# Patient Record
Sex: FEMALE | Race: WHITE | NOT HISPANIC OR LATINO | URBAN - METROPOLITAN AREA
[De-identification: names, ages, dates, MRNs, and addresses within clinical notes are randomized per-mention and may not be internally consistent; named-entity substitution may affect disease eponyms.]

---

## 2017-08-11 ENCOUNTER — OUTPATIENT (OUTPATIENT)
Dept: OUTPATIENT SERVICES | Facility: HOSPITAL | Age: 52
LOS: 1 days | Discharge: HOME | End: 2017-08-11

## 2017-08-22 ENCOUNTER — APPOINTMENT (OUTPATIENT)
Dept: RADIATION ONCOLOGY | Facility: CLINIC | Age: 52
End: 2017-08-22
Payer: COMMERCIAL

## 2017-08-22 VITALS
OXYGEN SATURATION: 97 % | BODY MASS INDEX: 25.57 KG/M2 | SYSTOLIC BLOOD PRESSURE: 119 MMHG | RESPIRATION RATE: 16 BRPM | HEIGHT: 65.75 IN | DIASTOLIC BLOOD PRESSURE: 82 MMHG | HEART RATE: 79 BPM | WEIGHT: 157.19 LBS

## 2017-08-22 DIAGNOSIS — E78.5 HYPERLIPIDEMIA, UNSPECIFIED: ICD-10-CM

## 2017-08-22 DIAGNOSIS — I10 ESSENTIAL (PRIMARY) HYPERTENSION: ICD-10-CM

## 2017-08-22 PROCEDURE — 99204 OFFICE O/P NEW MOD 45 MIN: CPT | Mod: GC

## 2017-08-22 RX ORDER — OXYCODONE AND ACETAMINOPHEN 5; 325 MG/1; MG/1
5-325 TABLET ORAL
Qty: 30 | Refills: 0 | Status: DISCONTINUED | COMMUNITY
Start: 2017-03-02

## 2017-08-22 RX ORDER — NORETHINDRONE ACETATE AND ETHINYL ESTRADIOL 1; 20 MG/1; UG/1
1-20 TABLET ORAL
Qty: 21 | Refills: 0 | Status: DISCONTINUED | COMMUNITY
Start: 2016-10-14

## 2017-08-22 RX ORDER — ATORVASTATIN CALCIUM 10 MG/1
10 TABLET, FILM COATED ORAL
Qty: 30 | Refills: 0 | Status: ACTIVE | COMMUNITY
Start: 2017-02-28

## 2017-08-22 RX ORDER — NITROFURANTOIN (MONOHYDRATE/MACROCRYSTALS) 25; 75 MG/1; MG/1
100 CAPSULE ORAL
Qty: 20 | Refills: 0 | Status: DISCONTINUED | COMMUNITY
Start: 2017-07-23

## 2017-08-22 RX ORDER — CLINDAMYCIN HYDROCHLORIDE 300 MG/1
300 CAPSULE ORAL
Qty: 21 | Refills: 0 | Status: DISCONTINUED | COMMUNITY
Start: 2017-03-03

## 2017-08-22 RX ORDER — SERTRALINE HYDROCHLORIDE 100 MG/1
100 TABLET, FILM COATED ORAL
Qty: 30 | Refills: 0 | Status: DISCONTINUED | COMMUNITY
Start: 2017-08-04

## 2017-08-22 RX ORDER — ELETRIPTAN HYDROBROMIDE 40 MG/1
40 TABLET, FILM COATED ORAL
Qty: 6 | Refills: 0 | Status: ACTIVE | COMMUNITY
Start: 2016-05-31

## 2017-08-22 RX ORDER — ALPRAZOLAM 0.25 MG/1
0.25 TABLET ORAL
Qty: 30 | Refills: 0 | Status: DISCONTINUED | COMMUNITY
Start: 2017-07-21

## 2022-10-12 ENCOUNTER — APPOINTMENT (OUTPATIENT)
Dept: ORTHOPEDIC SURGERY | Facility: CLINIC | Age: 57
End: 2022-10-12

## 2022-10-12 VITALS — BODY MASS INDEX: 24.11 KG/M2 | WEIGHT: 150 LBS | HEIGHT: 66 IN

## 2022-10-12 PROCEDURE — 99213 OFFICE O/P EST LOW 20 MIN: CPT | Mod: 25

## 2022-10-12 PROCEDURE — J3490M: CUSTOM

## 2022-10-12 PROCEDURE — 20611 DRAIN/INJ JOINT/BURSA W/US: CPT

## 2022-10-12 NOTE — DISCUSSION/SUMMARY
[de-identified] : Patient allowed to gently start resuming activities.\par Discussed change to medication prescription and usage. \par Offered cortisone steroid injection. \par Bracing options discussed with patient. \par cont with sleeve\par \par RE:  CINDI GARDINER \par \par Acct #- [id number] \par \par Attention:  Nurse Reviewer /Medical Director\par \par  \par Based on my patient's condition, I strongly believe that the MRI L knee is medically.necessary.  \par The patient has failed oral meds, injections and PT and conservative treatment in combination or by themselves and therefore needs the MRI.  \par The MRI will dictate further treatment t recommendations.\par \par

## 2022-10-12 NOTE — HISTORY OF PRESENT ILLNESS
[8] : 8 [0] : 0 [de-identified] : PAtient has increased symptoms in her left knee, pain with walking, stairs, kneeling, squatting. She had CSI in February which was helpful until recently. No recent injury. [FreeTextEntry1] : left knee  [de-identified] : tylenol used and knee sleeve

## 2022-10-18 ENCOUNTER — FORM ENCOUNTER (OUTPATIENT)
Age: 57
End: 2022-10-18

## 2022-10-19 ENCOUNTER — APPOINTMENT (OUTPATIENT)
Dept: MRI IMAGING | Facility: CLINIC | Age: 57
End: 2022-10-19

## 2022-10-19 PROCEDURE — 73721 MRI JNT OF LWR EXTRE W/O DYE: CPT | Mod: LT

## 2022-10-25 ENCOUNTER — APPOINTMENT (OUTPATIENT)
Dept: ORTHOPEDIC SURGERY | Facility: CLINIC | Age: 57
End: 2022-10-25

## 2022-10-25 VITALS — HEIGHT: 66 IN | BODY MASS INDEX: 24.91 KG/M2 | WEIGHT: 155 LBS

## 2022-10-25 DIAGNOSIS — M70.52 OTHER BURSITIS OF KNEE, LEFT KNEE: ICD-10-CM

## 2022-10-25 DIAGNOSIS — M17.12 UNILATERAL PRIMARY OSTEOARTHRITIS, LEFT KNEE: ICD-10-CM

## 2022-10-25 PROCEDURE — 99214 OFFICE O/P EST MOD 30 MIN: CPT

## 2022-10-25 NOTE — DISCUSSION/SUMMARY
[de-identified] : Patient allowed to gently start resuming activities.\par Discussed change to medication prescription and usage. \par Bracing options discussed with patient. \par Hyaluronic Acid inj pamphlet given to pt.\par \par 10/25/2022 \par \par  RE:  CINDI GARDINER \par \par Acct #- [id number] \par \par \par Attention:  Nurse Reviewer /Medical Director\par \par I am writing this letter as a medical necessity for PT program.\par Patient has tried analgesics, non-steroid anti-inflammatory agents, \par hot or cold compresses,injections of corticosteroids, etc)  which in combination or by themselves has not worked.\par Based on my patient's condition, I strongly believe that the PT is medically needed.\par  \par Thank you for your time and consideration.   \par \par \par

## 2022-10-25 NOTE — HISTORY OF PRESENT ILLNESS
[8] : 8 [0] : 0 [de-identified] : Patient has increased symptoms in her left knee, pain with walking, stairs, kneeling, squatting. She had CSI iwith some help and had MRI [FreeTextEntry1] : left knee  [de-identified] : tylenol used and knee sleeve

## 2022-10-25 NOTE — PHYSICAL EXAM
[Left] : left knee [5___] : hamstring 5[unfilled]/5 [Positive] : positive Huang [] : medial joint line tenderness [TWNoteComboBox7] : flexion 130 degrees [de-identified] : extension 0 degrees

## 2022-10-25 NOTE — DATA REVIEWED
[MRI] : MRI [Left] : left [Knee] : knee [Report was reviewed and noted in the chart] : The report was reviewed and noted in the chart [I independently reviewed and interpreted images and report] : I independently reviewed and interpreted images and report [FreeTextEntry1] : 1. Findings suggesting significant iliotibial band syndrome with inflammatory change in the distal vastus \par lateralis muscle and suprapatellar synovitis above the knee laterally. In the absence of chronic repetitive stress \par injury in the area inflammatory arthropathy should be considered and ruled out clinically with possible myositis.\par 2. 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 Mild chronic ACL sprain.\par 4. Medial and lateral meniscal degeneration without tear.\par 5. No acute fracture.

## 2023-01-18 ENCOUNTER — APPOINTMENT (OUTPATIENT)
Dept: ORTHOPEDIC SURGERY | Facility: CLINIC | Age: 58
End: 2023-01-18
Payer: COMMERCIAL

## 2023-01-18 VITALS — WEIGHT: 155 LBS | HEIGHT: 66 IN | BODY MASS INDEX: 24.91 KG/M2

## 2023-01-18 DIAGNOSIS — M62.830 MUSCLE SPASM OF BACK: ICD-10-CM

## 2023-01-18 DIAGNOSIS — M43.16 SPONDYLOLISTHESIS, LUMBAR REGION: ICD-10-CM

## 2023-01-18 DIAGNOSIS — I10 ESSENTIAL (PRIMARY) HYPERTENSION: ICD-10-CM

## 2023-01-18 PROCEDURE — 20552 NJX 1/MLT TRIGGER POINT 1/2: CPT

## 2023-01-18 PROCEDURE — 99214 OFFICE O/P EST MOD 30 MIN: CPT | Mod: 25

## 2023-01-18 PROCEDURE — 72100 X-RAY EXAM L-S SPINE 2/3 VWS: CPT

## 2023-01-18 RX ORDER — METHYLPREDNISOLONE 4 MG/1
4 TABLET ORAL
Qty: 1 | Refills: 0 | Status: ACTIVE | COMMUNITY
Start: 2023-01-18 | End: 1900-01-01

## 2023-01-18 NOTE — ASSESSMENT
[FreeTextEntry1] : will administer tpi left side today, start her on medrol robaxin and therapy f/u 4 weeks

## 2023-01-18 NOTE — IMAGING
[de-identified] : Spine:\par Inspection/Palpation:\par No tenderness to palpation throughout Cervical/thoracic/lumbar spine. TTP over right parapsinal \par No bony stepoffs, No lesions.\par \par Gait:\par antalgic, able to perform bilateral toe and heel rise.  Able to perform tandem gait.  \par \par Range of Motion:\par Lumbar Spine: Flexion to 90 degrees, Extension to 30 degrees, rotation 30 degrees bilaterally, lateral flexion to 30 degrees bilaterally.\par \par Neurologic:\par \par Bilateral Lower Extremities 5/5 Iliopsoas/Quadriceps/Hamstrings/ Tibialis Anterior/ Gastrocnemius. Extensor Hallucis Longus/ Flexor Hallucis Longus except \par \par \par Sensation intact to light touch L2-S1\par \par X-ray AP/lateral lumbar spine with transitonal antomy.  scoliosis L3-4 and L4-5 disc height loss.  [de-identified] : normal [FreeTextEntry1] : she has autofusion of L5-s1, straightening and 3-4 narrowing with spondylolisthesis

## 2023-01-18 NOTE — HISTORY OF PRESENT ILLNESS
[2] : 2 [6] : 6 [Injection therapy] : injection therapy [de-identified] : 1-18-23- She is known to have had prior lumbar sprain spasm episodes. LAst significant one was in 2018 and she was treated by DR Haji did well with medrol therapy and in office tpi injection. \par she notes her present episode came on yesterday while lifting. Developed sharp left sided pain with spasm and limited range of motion. denies radicular complaints\par \par she works as  \par \par denies contributory pmh [] : no [FreeTextEntry1] : Lower back [FreeTextEntry9] : Flexeril [de-identified] : flexion, getting out of a chair

## 2023-01-18 NOTE — HISTORY OF PRESENT ILLNESS
[2] : 2 [6] : 6 [Injection therapy] : injection therapy [de-identified] : 1-18-23- She is known to have had prior lumbar sprain spasm episodes. LAst significant one was in 2018 and she was treated by DR Haji did well with medrol therapy and in office tpi injection. \par she notes her present episode came on yesterday while lifting. Developed sharp left sided pain with spasm and limited range of motion. denies radicular complaints\par \par she works as  \par \par denies contributory pmh [] : no [FreeTextEntry1] : Lower back [FreeTextEntry9] : Flexeril [de-identified] : flexion, getting out of a chair

## 2023-01-18 NOTE — IMAGING
[de-identified] : Spine:\par Inspection/Palpation:\par No tenderness to palpation throughout Cervical/thoracic/lumbar spine. TTP over right parapsinal \par No bony stepoffs, No lesions.\par \par Gait:\par antalgic, able to perform bilateral toe and heel rise.  Able to perform tandem gait.  \par \par Range of Motion:\par Lumbar Spine: Flexion to 90 degrees, Extension to 30 degrees, rotation 30 degrees bilaterally, lateral flexion to 30 degrees bilaterally.\par \par Neurologic:\par \par Bilateral Lower Extremities 5/5 Iliopsoas/Quadriceps/Hamstrings/ Tibialis Anterior/ Gastrocnemius. Extensor Hallucis Longus/ Flexor Hallucis Longus except \par \par \par Sensation intact to light touch L2-S1\par \par X-ray AP/lateral lumbar spine with transitonal antomy.  scoliosis L3-4 and L4-5 disc height loss.  [FreeTextEntry1] : she has autofusion of L5-s1, straightening and 3-4 narrowing with spondylolisthesis [de-identified] : normal

## 2023-01-18 NOTE — PROCEDURE
[Trigger point 1-2 muscle groups] : trigger point 1-2 muscle groups [Left] : of the left [Lumbar paraspinal muscle] : lumbar paraspinal muscle [Pain] : pain [Inflammation] : inflammation [X-ray evidence of Osteoarthritis on this or prior visit] : x-ray evidence of Osteoarthritis on this or prior visit [Alcohol] : alcohol [Betadine] : betadine [Ethyl Chloride sprayed topically] : ethyl chloride sprayed topically [Sterile technique used] : sterile technique used [___ cc    0.25%] : Bupivacaine (Marcaine) ~Vcc of 0.25%  [___ cc    40mg] : Triamcinolone (Kenalog) ~Vcc of 40 mg  [] : Patient tolerated procedure well [Call if redness, pain or fever occur] : call if redness, pain or fever occur [Apply ice for 15min out of every hour for the next 12-24 hours as tolerated] : apply ice for 15 minutes out of every hour for the next 12-24 hours as tolerated [Patient was advised to rest the joint(s) for ____ days] : patient was advised to rest the joint(s) for [unfilled] days [Previous OTC use and PT nontherapeutic] : patient has tried OTC's including aspirin, Ibuprofen, Aleve, etc or prescription NSAIDS, and/or exercises at home and/or physical therapy without satisfactory response [Patient had decreased mobility in the joint] : patient had decreased mobility in the joint [Risks, benefits, alternatives discussed / Verbal consent obtained] : the risks benefits, and alternatives have been discussed, and verbal consent was obtained

## 2023-02-17 ENCOUNTER — APPOINTMENT (OUTPATIENT)
Dept: ORTHOPEDIC SURGERY | Facility: CLINIC | Age: 58
End: 2023-02-17
Payer: COMMERCIAL

## 2023-02-17 VITALS — BODY MASS INDEX: 24.91 KG/M2 | WEIGHT: 155 LBS | HEIGHT: 66 IN

## 2023-02-17 PROCEDURE — 99213 OFFICE O/P EST LOW 20 MIN: CPT

## 2023-02-17 NOTE — IMAGING
[FreeTextEntry1] : she has autofusion of L5-s1, straightening and 3-4 narrowing with spondylolisthesis [de-identified] : normal

## 2023-02-17 NOTE — ASSESSMENT
[FreeTextEntry1] : 57 F with lumbar strain that improved and then acutely worsened. \par c/w Robaxin PRIN\par No NSAIDs due allergy\par MRI L spine to set up for possible LILIANA\par C/w PT

## 2023-02-17 NOTE — HISTORY OF PRESENT ILLNESS
[Lower back] : lower back [7] : 7 [5] : 5 [Localized] : localized [Sharp] : sharp [Constant] : constant [de-identified] : 1-18-23- She is known to have had prior lumbar sprain spasm episodes. LAst significant one was in 2018 and she was treated by DR Haji did well with medrol therapy and in office tpi injection. \par she notes her present episode came on yesterday while lifting. Developed sharp left sided pain with spasm and limited range of motion. 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  Has been taking muscle relaxants at night with some relief.  Cant take during day because it makes her too drowsy.  No NSAIDS due to allergy.  Has a heart procedure coming up next week.    [] : Post Surgical Visit: no [FreeTextEntry5] : Pt is here for follow up of lower back. Pain has improved since the last visit. Pt is attending PT 2x weekly. Pt received TPI at last visit with some relief.  [de-identified] : 2/17/23 [de-identified] : PT, TPI, meds

## 2023-02-19 ENCOUNTER — FORM ENCOUNTER (OUTPATIENT)
Age: 58
End: 2023-02-19

## 2023-02-20 ENCOUNTER — APPOINTMENT (OUTPATIENT)
Dept: MRI IMAGING | Facility: CLINIC | Age: 58
End: 2023-02-20
Payer: COMMERCIAL

## 2023-02-20 PROCEDURE — 72148 MRI LUMBAR SPINE W/O DYE: CPT

## 2023-03-08 ENCOUNTER — APPOINTMENT (OUTPATIENT)
Dept: ORTHOPEDIC SURGERY | Facility: CLINIC | Age: 58
End: 2023-03-08
Payer: COMMERCIAL

## 2023-03-08 VITALS — WEIGHT: 155 LBS | BODY MASS INDEX: 24.91 KG/M2 | HEIGHT: 66 IN

## 2023-03-08 DIAGNOSIS — S33.5XXA SPRAIN OF LIGAMENTS OF LUMBAR SPINE, INITIAL ENCOUNTER: ICD-10-CM

## 2023-03-08 DIAGNOSIS — M51.36 OTHER INTERVERTEBRAL DISC DEGENERATION, LUMBAR REGION: ICD-10-CM

## 2023-03-08 PROCEDURE — 99213 OFFICE O/P EST LOW 20 MIN: CPT

## 2023-03-08 NOTE — IMAGING
[de-identified] : MRI L spine \par 1. Convex right curvature.\par 2. L1-L2: Facet hypertrophy, ligamentum flavum hypertrophy, and minimal facet effusions.\par 3. L2-L3: Facet hypertrophy, ligamentum flavum hypertrophy, and facet effusion.\par 4. L3-L4: Loss of disc signal and height. Broad bulge, facet hypertrophy, ligamentum flavum hypertrophy.\par 5. L4-L5: Loss of disc signal. Broad bulge, facet hypertrophy. Central herniation and annular fissure with \par caudal migration posterior to L5.\par 6. L5-S1: Partial sacralization on the left. Facet hypertrophy.\par 7. 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 5 mm nonspecific high-signal intensity lesion on\par STIR imaging within the posterior superior margin of L2 to the left of midline with no surrounding marrow \par edema. Follow up imaging in three months suggested to confirm stability. [FreeTextEntry1] : she has autofusion of L5-s1, straightening and 3-4 narrowing with spondylolisthesis [de-identified] : normal

## 2023-03-08 NOTE — HISTORY OF PRESENT ILLNESS
[Lower back] : lower back [7] : 7 [5] : 5 [Localized] : localized [Sharp] : sharp [Constant] : constant [de-identified] : 1-18-23- She is known to have had prior lumbar sprain spasm episodes. LAst significant one was in 2018 and she was treated by DR Haji did well with medrol therapy and in office tpi injection. \par she notes her present episode came on yesterday while lifting. Developed sharp left sided pain with spasm and limited range of motion. 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  Has been taking muscle relaxants at night with some relief.  Cant take during day because it makes her too drowsy.  No NSAIDS due to allergy.  Has a heart procedure coming up next week.   \par \par 3/8/23 Pain is stable.  HEre today for MRI results.  Pain is worse in back. No radicular symptoms.  [] : Post Surgical Visit: no [FreeTextEntry5] : Pt is here for follow up of lower back. Pain has improved since the last visit. Pt is attending PT 2x weekly. Pt received TPI at last visit with some relief.  [de-identified] : 2/17/23 [de-identified] : PT, TPI, meds

## 2023-03-08 NOTE — HISTORY OF PRESENT ILLNESS
[Lower back] : lower back [7] : 7 [5] : 5 [Localized] : localized [Sharp] : sharp [Constant] : constant [de-identified] : 1-18-23- She is known to have had prior lumbar sprain spasm episodes. LAst significant one was in 2018 and she was treated by DR Haji did well with medrol therapy and in office tpi injection. \par she notes her present episode came on yesterday while lifting. Developed sharp left sided pain with spasm and limited range of motion. 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  Has been taking muscle relaxants at night with some relief.  Cant take during day because it makes her too drowsy.  No NSAIDS due to allergy.  Has a heart procedure coming up next week.   \par \par 3/8/23 Pain is stable.  HEre today for MRI results.  Pain is worse in back. No radicular symptoms.  [] : Post Surgical Visit: no [FreeTextEntry5] : Pt is here for follow up of lower back. Pain has improved since the last visit. Pt is attending PT 2x weekly. Pt received TPI at last visit with some relief.  [de-identified] : 2/17/23 [de-identified] : PT, TPI, meds

## 2023-03-08 NOTE — ASSESSMENT
[FreeTextEntry1] : 57 F with lumbar strain that improved and then acutely worsened. \par c/w Robaxin PRIN\par No NSAIDs due allergy\par FU with pain management for LILIANA\par FU 2 months

## 2023-03-08 NOTE — IMAGING
[de-identified] : MRI L spine \par 1. Convex right curvature.\par 2. L1-L2: Facet hypertrophy, ligamentum flavum hypertrophy, and minimal facet effusions.\par 3. L2-L3: Facet hypertrophy, ligamentum flavum hypertrophy, and facet effusion.\par 4. L3-L4: Loss of disc signal and height. Broad bulge, facet hypertrophy, ligamentum flavum hypertrophy.\par 5. L4-L5: Loss of disc signal. Broad bulge, facet hypertrophy. Central herniation and annular fissure with \par caudal migration posterior to L5.\par 6. L5-S1: Partial sacralization on the left. Facet hypertrophy.\par 7. 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 5 mm nonspecific high-signal intensity lesion on\par STIR imaging within the posterior superior margin of L2 to the left of midline with no surrounding marrow \par edema. Follow up imaging in three months suggested to confirm stability. [FreeTextEntry1] : she has autofusion of L5-s1, straightening and 3-4 narrowing with spondylolisthesis [de-identified] : normal

## 2023-03-10 NOTE — PHYSICAL EXAM
[Left] : left knee [5___] : hamstring 5[unfilled]/5 [] : no calf tenderness [Positive] : positive Huang [TWNoteComboBox7] : flexion 130 degrees [de-identified] : extension 0 degrees negative

## 2023-03-24 ENCOUNTER — APPOINTMENT (OUTPATIENT)
Dept: PAIN MANAGEMENT | Facility: CLINIC | Age: 58
End: 2023-03-24
Payer: COMMERCIAL

## 2023-03-24 VITALS — WEIGHT: 155 LBS | BODY MASS INDEX: 24.91 KG/M2 | HEIGHT: 66 IN

## 2023-03-24 PROCEDURE — 99214 OFFICE O/P EST MOD 30 MIN: CPT

## 2023-03-28 RX ORDER — CYCLOBENZAPRINE HYDROCHLORIDE 5 MG/1
5 TABLET, FILM COATED ORAL
Qty: 30 | Refills: 0 | Status: ACTIVE | COMMUNITY
Start: 2023-03-28 | End: 1900-01-01

## 2023-03-31 NOTE — DISCUSSION/SUMMARY
[de-identified] : After discussing various treatment options with the patient including but not limited to oral medications, physical therapy, exercise modalities as well as interventional spinal injections, we have decided with the following plan:\par \par - Continue Home exercises, stretching, activity modification, physical therapy, and conservative care.\par - MRI report and/or images was reviewed and discussed with the patient.\par - Recommend L4-5 Lumbar Epidural Steroid Injection under fluoroscopic guidance with image.\par - The risks, benefits and alternatives of the proposed procedure were explained in detail with the patient. The risks outlined include but are not limited to infection, bleeding, post-dural puncture headache, nerve injury, a temporary increase in pain, failure to resolve symptoms, allergic reaction, symptom recurrence, and possible elevation of blood sugar in diabetics. All questions were answered to patient's apparent satisfaction and he/she verbalized an understanding.\par - Patient is presenting with acute/sub-acute radicular pain with impairment in ADLs and functionality.  The pain has not responded to conservative care including NSAID therapy and/or physical therapy.  There is no bleeding tendency, unstable medical condition, or systemic infection.\par - Follow up in 1-2 weeks post injection for re-evaluation.\par *pt takes 81mg Aspirin\par \par Addendum 03/31/2023: Pain is radiating down the b/l posterior thighs and lower legs R>L. \par

## 2023-03-31 NOTE — PHYSICAL EXAM
[de-identified] : Constitutional; Appears well, no apparent distress\par Ability to communicate: Normal \par Respiratory: non-labored breathing\par Skin: No rash noted\par Head: Normocephalic, atraumatic\par Neck: no visible thyroid enlargement\par Eyes: Extraocular movements intact\par Neurologic: Alert and oriented x3\par Psychiatric: normal mood, affect and behavior  [] : no ecchymosis

## 2023-03-31 NOTE — HISTORY OF PRESENT ILLNESS
[Upper back] : upper back [Mid-back] : mid-back [Lower back] : lower back [8] : 8 [9] : 9 [Dull/Aching] : dull/aching [Localized] : localized [Shooting] : shooting [Stabbing] : stabbing [Constant] : constant [Household chores] : household chores [Rest] : rest [Heat] : heat [Nothing helps with pain getting better] : Nothing helps with pain getting better [Standing] : standing [Walking] : walking [FreeTextEntry1] : Initial HPI 03/24/23: \par Pain started 2 months ago and is on the B/L lower back R>L described as a burning pain worse with sitting and laying down. Saw Dr. Geovanni Christopher who recommended LILIANA. \par \par MRI Lumbar Spine 2/20/23 independently reviewed: L4-5 HNP and annular fissure \par Conservative Care: Has been doing PT; tried acupuncture\par Pain Medications: Robaxin which helped her sleep; Gabapentin 600mg QHS \par Past Injections: TPIs usually help but recently have not \par Spine surgery: none \par Blood thinners: *pt takes 81mg Aspirin\par \par  [] : no [de-identified] : l mri

## 2023-04-19 ENCOUNTER — APPOINTMENT (OUTPATIENT)
Age: 58
End: 2023-04-19

## 2023-05-05 ENCOUNTER — APPOINTMENT (OUTPATIENT)
Dept: ORTHOPEDIC SURGERY | Facility: CLINIC | Age: 58
End: 2023-05-05

## 2023-05-12 ENCOUNTER — APPOINTMENT (OUTPATIENT)
Dept: PAIN MANAGEMENT | Facility: CLINIC | Age: 58
End: 2023-05-12

## 2023-07-03 ENCOUNTER — APPOINTMENT (OUTPATIENT)
Age: 58
End: 2023-07-03
Payer: COMMERCIAL

## 2023-07-03 PROCEDURE — 62323 NJX INTERLAMINAR LMBR/SAC: CPT

## 2023-07-17 ENCOUNTER — RX RENEWAL (OUTPATIENT)
Age: 58
End: 2023-07-17

## 2023-07-17 RX ORDER — METHOCARBAMOL 750 MG/1
750 TABLET, FILM COATED ORAL TWICE DAILY
Qty: 60 | Refills: 0 | Status: ACTIVE | COMMUNITY
Start: 2023-01-18 | End: 1900-01-01

## 2023-07-21 ENCOUNTER — APPOINTMENT (OUTPATIENT)
Dept: PAIN MANAGEMENT | Facility: CLINIC | Age: 58
End: 2023-07-21
Payer: COMMERCIAL

## 2023-07-21 VITALS — WEIGHT: 156 LBS | HEIGHT: 66 IN | BODY MASS INDEX: 25.07 KG/M2

## 2023-07-21 DIAGNOSIS — M54.50 LOW BACK PAIN, UNSPECIFIED: ICD-10-CM

## 2023-07-21 DIAGNOSIS — M54.17 RADICULOPATHY, LUMBOSACRAL REGION: ICD-10-CM

## 2023-07-21 PROCEDURE — 99213 OFFICE O/P EST LOW 20 MIN: CPT

## 2023-07-21 NOTE — PHYSICAL EXAM
[de-identified] : Constitutional; Appears well, no apparent distress\par Ability to communicate: Normal \par Respiratory: non-labored breathing\par Skin: No rash noted\par Head: Normocephalic, atraumatic\par Neck: no visible thyroid enlargement\par Eyes: Extraocular movements intact\par Neurologic: Alert and oriented x3\par Psychiatric: normal mood, affect and behavior  [] : no ecchymosis

## 2023-07-21 NOTE — DISCUSSION/SUMMARY
[de-identified] : After discussing various treatment options with the patient including but not limited to oral medications, physical therapy, exercise modalities as well as interventional spinal injections, we have decided with the following plan:\par \par - Continue home exercises, stretching, activity modification, physical therapy, and conservative care.\par - Follow-up as needed.\par - Will provide prescription for Physical Therapy.\par - Can try Flexeril in the evening, as she doesn’t want to take it in the morning due to drowsiness.

## 2023-07-21 NOTE — HISTORY OF PRESENT ILLNESS
[Upper back] : upper back [Mid-back] : mid-back [Lower back] : lower back [Dull/Aching] : dull/aching [Localized] : localized [Shooting] : shooting [Stabbing] : stabbing [Constant] : constant [Household chores] : household chores [Rest] : rest [Heat] : heat [Nothing helps with pain getting better] : Nothing helps with pain getting better [Standing] : standing [Walking] : walking [6] : 6 [7] : 7 [Injection therapy] : injection therapy [FreeTextEntry1] : 07/21/2023: s/p L4-L5 LESI  on 07/03/23 with >60% relief and improvement of ADLs. Still has pain in the morning after walking up but usually gets better on its own now without medications.\par \par Initial HPI 03/24/23: \par Pain started 2 months ago and is on the B/L lower back R>L described as a burning pain worse with sitting and laying down. Saw Dr. Geovanni Christopher who recommended LILIANA. \par \par MRI Lumbar Spine 2/20/23 independently reviewed: L4-5 HNP and annular fissure \par Conservative Care: Has been doing PT; tried acupuncture\par Pain Medications: Robaxin which helped her sleep; Gabapentin 600mg QHS; allergic to advil/naproxen \par Past Injections: TPIs usually help but recently have not \par Spine surgery: none \par Blood thinners: *pt takes 81mg Aspirin\par \par  [] : no [de-identified] : l mri

## 2025-04-21 ENCOUNTER — APPOINTMENT (OUTPATIENT)
Dept: ORTHOPEDIC SURGERY | Facility: CLINIC | Age: 60
End: 2025-04-21

## 2025-04-21 VITALS — WEIGHT: 156 LBS | BODY MASS INDEX: 25.07 KG/M2 | HEIGHT: 66 IN

## 2025-04-21 DIAGNOSIS — M65.341 TRIGGER FINGER, RIGHT RING FINGER: ICD-10-CM

## 2025-04-21 PROCEDURE — 99203 OFFICE O/P NEW LOW 30 MIN: CPT | Mod: 25

## 2025-04-21 PROCEDURE — 73130 X-RAY EXAM OF HAND: CPT | Mod: RT

## 2025-04-21 PROCEDURE — 76942 ECHO GUIDE FOR BIOPSY: CPT

## 2025-04-21 PROCEDURE — 20550 NJX 1 TENDON SHEATH/LIGAMENT: CPT | Mod: RT

## 2025-06-06 ENCOUNTER — APPOINTMENT (OUTPATIENT)
Dept: ORTHOPEDIC SURGERY | Facility: CLINIC | Age: 60
End: 2025-06-06

## 2025-06-06 VITALS — BODY MASS INDEX: 25.07 KG/M2 | HEIGHT: 66 IN | WEIGHT: 156 LBS

## 2025-06-06 PROCEDURE — 20550 NJX 1 TENDON SHEATH/LIGAMENT: CPT | Mod: RT

## 2025-06-06 PROCEDURE — 76942 ECHO GUIDE FOR BIOPSY: CPT

## 2025-07-11 ENCOUNTER — APPOINTMENT (OUTPATIENT)
Dept: ORTHOPEDIC SURGERY | Facility: CLINIC | Age: 60
End: 2025-07-11

## 2025-07-11 VITALS — BODY MASS INDEX: 25.07 KG/M2 | WEIGHT: 156 LBS | HEIGHT: 66 IN

## 2025-07-11 PROCEDURE — 20550 NJX 1 TENDON SHEATH/LIGAMENT: CPT | Mod: RT

## 2025-07-11 PROCEDURE — 76942 ECHO GUIDE FOR BIOPSY: CPT | Mod: RT
